# Patient Record
Sex: MALE | Race: WHITE | NOT HISPANIC OR LATINO | URBAN - METROPOLITAN AREA
[De-identification: names, ages, dates, MRNs, and addresses within clinical notes are randomized per-mention and may not be internally consistent; named-entity substitution may affect disease eponyms.]

---

## 2021-07-27 ENCOUNTER — OFFICE (OUTPATIENT)
Dept: URBAN - METROPOLITAN AREA TELEHEALTH 7 | Facility: TELEHEALTH | Age: 21
End: 2021-07-27

## 2021-07-27 VITALS — WEIGHT: 140 LBS | HEIGHT: 70 IN

## 2021-07-27 DIAGNOSIS — R10.9 UNSPECIFIED ABDOMINAL PAIN: ICD-10-CM

## 2021-07-27 DIAGNOSIS — R19.7 DIARRHEA, UNSPECIFIED: ICD-10-CM

## 2021-07-27 PROCEDURE — 99204 OFFICE O/P NEW MOD 45 MIN: CPT | Mod: 95 | Performed by: PHYSICIAN ASSISTANT

## 2021-07-27 NOTE — SERVICEHPINOTES
PATIENT VERIFIED BY DATE OF BIRTH AND NAME. Patient has been consented for this telecommunication visit.   For the past few yrs, he has been experiencing intermittent stomach pain and diarrhea. Sometimes he will be eating a meal and have to use the restroom right after. He has diarrhea on average a few times per week. Certain foods like stir millan and fast food will trigger symptoms. No blood in the stool, weight loss, or nocturnal stools. He has cramping prior to a BM which is alleviated by a BM. When he has diarrhea, it is usually one loose stool and not all day long. No family hx of GI issues.   No other GI issues.

## 2021-08-12 LAB
C-REACTIVE PROTEIN: <0.2 MG/L
SED RATE BY MODIFIED WESTERGREN: 2 MM/H (ref ?–15)

## 2021-08-20 LAB — CALPROTECTIN, STOOL: 13 MCG/G

## 2021-09-07 ENCOUNTER — OFFICE (OUTPATIENT)
Dept: URBAN - METROPOLITAN AREA TELEHEALTH 7 | Facility: TELEHEALTH | Age: 21
End: 2021-09-07

## 2021-09-07 VITALS — HEIGHT: 70 IN | WEIGHT: 140 LBS

## 2021-09-07 DIAGNOSIS — R19.7 DIARRHEA, UNSPECIFIED: ICD-10-CM

## 2021-09-07 DIAGNOSIS — R10.9 UNSPECIFIED ABDOMINAL PAIN: ICD-10-CM

## 2021-09-07 PROCEDURE — 99214 OFFICE O/P EST MOD 30 MIN: CPT | Mod: 95 | Performed by: PHYSICIAN ASSISTANT

## 2021-09-07 NOTE — SERVICEHPINOTES
PATIENT VERIFIED BY DATE OF BIRTH AND NAME. Patient has been consented for this telecommunication visit.   Mr. Art is here for f/u regarding likely IBS-D. His symptoms are intermittent abdominal pain and diarrhea. He has cramping prior to a BM which is alleviated by a BM. He has been taking IBgard daily (when he remembers) which has made a noticeable improvement in his symptoms. Though the IBgard has helped, he wants a prescription antispasmodic to have on hand if needed. Stool form is improved too. He notices now that coffee will trigger symptoms as can oily foods. He denies blood in stool, weight loss, and nocturnal stools. No family hx of GI issues. No other GI related complaints today. ROS as per HPI and otherwise is unremarkable.